# Patient Record
Sex: MALE | Race: WHITE | NOT HISPANIC OR LATINO | URBAN - METROPOLITAN AREA
[De-identification: names, ages, dates, MRNs, and addresses within clinical notes are randomized per-mention and may not be internally consistent; named-entity substitution may affect disease eponyms.]

---

## 2024-02-07 ENCOUNTER — TELEPHONE (OUTPATIENT)
Dept: PEDIATRICS | Facility: CLINIC | Age: 19
End: 2024-02-07

## 2024-02-07 NOTE — TELEPHONE ENCOUNTER
----- Message from Nathan Mohan MD sent at 1/22/2024 12:37 PM EST -----  Regarding: schedule Well Adult Visit  Let PATIENT know that they are due for Well Adult Care.    Based on our records, HE is quite  behind on their medical care and/or on immunizations.  If HE is followed by another provider, let us know and we will update our records.    Otherwise, please schedule a Well Visit as soon as possible.     If unable to reach by phone / portal, please send letter

## 2024-03-08 ENCOUNTER — TELEPHONE (OUTPATIENT)
Dept: PEDIATRICS | Facility: CLINIC | Age: 19
End: 2024-03-08

## 2024-03-08 NOTE — TELEPHONE ENCOUNTER
----- Message from Nathan Mohan MD sent at 3/7/2024 11:58 AM EST -----  Regarding: schedule Well Adult Visit  Let PATIENT know that they are due for Well Adult Care.    Please schedule such as soon as possible.     If unable to reach by phone / portal, please send letter

## 2024-05-14 ENCOUNTER — TELEPHONE (OUTPATIENT)
Dept: PEDIATRICS | Facility: CLINIC | Age: 19
End: 2024-05-14

## 2024-05-14 NOTE — TELEPHONE ENCOUNTER
----- Message from Nathan Mohan MD sent at 4/29/2024  2:16 PM EDT -----  Regarding: schedule Well Adult Visit  Let PATIENT know that they are due for Well Adult Care.    Please schedule such as soon as possible.     If unable to reach by phone / portal, please send letter

## 2024-05-14 NOTE — TELEPHONE ENCOUNTER
Called spoke to family member informed pt is due for Well Adult Care.  pt is at work they will have him call back.

## 2024-07-10 ENCOUNTER — TELEPHONE (OUTPATIENT)
Dept: PRIMARY CARE | Facility: CLINIC | Age: 19
End: 2024-07-10

## 2024-07-10 NOTE — TELEPHONE ENCOUNTER
----- Message from Nathan Mohan sent at 7/10/2024 10:03 AM EDT -----  Regarding: schedule Well Adult Visit  Let PATIENT know that they are due for Well Adult Care.    Please schedule such as soon as possible.     If unable to reach by phone / portal, please send letter

## 2024-10-01 ENCOUNTER — TELEPHONE (OUTPATIENT)
Dept: PEDIATRICS | Facility: CLINIC | Age: 19
End: 2024-10-01

## 2024-10-01 NOTE — TELEPHONE ENCOUNTER
----- Message from Nathan Mohan sent at 9/27/2024 11:26 AM EDT -----  Regarding: schedule Well Adult Visit  Let PATIENT know that they are due for Well Adult Care.    Based on our records, patient is quite  behind on their medical care and/or on immunizations.  If patient is followed by another provider, let us know and we will update our records.    Otherwise, please schedule a Well Visit as soon as possible.     If unable to reach by phone / portal, please send letter

## 2024-10-31 ENCOUNTER — TELEPHONE (OUTPATIENT)
Dept: PEDIATRICS | Facility: CLINIC | Age: 19
End: 2024-10-31

## 2024-11-01 PROBLEM — Z00.00 WELL ADULT HEALTH CHECK: Status: ACTIVE | Noted: 2024-11-01

## 2024-11-01 PROBLEM — I45.10 INCOMPLETE RIGHT BUNDLE BRANCH BLOCK: Status: ACTIVE | Noted: 2024-11-01

## 2024-11-01 PROBLEM — H54.7 VISUAL ACUITY REDUCED: Status: ACTIVE | Noted: 2024-11-01

## 2024-11-01 PROBLEM — E55.9 VITAMIN D DEFICIENCY: Status: ACTIVE | Noted: 2024-11-01

## 2024-11-01 PROBLEM — E78.5 DYSLIPIDEMIA: Status: ACTIVE | Noted: 2024-11-01

## 2024-11-04 ENCOUNTER — APPOINTMENT (OUTPATIENT)
Dept: PEDIATRICS | Facility: CLINIC | Age: 19
End: 2024-11-04
Payer: COMMERCIAL

## 2024-11-04 ENCOUNTER — TELEPHONE (OUTPATIENT)
Dept: PEDIATRICS | Facility: CLINIC | Age: 19
End: 2024-11-04

## 2024-11-04 VITALS
HEIGHT: 73 IN | SYSTOLIC BLOOD PRESSURE: 130 MMHG | RESPIRATION RATE: 20 BRPM | BODY MASS INDEX: 20.28 KG/M2 | DIASTOLIC BLOOD PRESSURE: 68 MMHG | TEMPERATURE: 98.7 F | WEIGHT: 153 LBS | HEART RATE: 80 BPM

## 2024-11-04 DIAGNOSIS — R94.31 SHORTENED PR INTERVAL: ICD-10-CM

## 2024-11-04 DIAGNOSIS — Z00.00 WELL ADULT HEALTH CHECK: Primary | ICD-10-CM

## 2024-11-04 DIAGNOSIS — Z13.0 ENCOUNTER FOR SCREENING FOR HEMATOLOGIC DISORDER: ICD-10-CM

## 2024-11-04 DIAGNOSIS — E55.9 VITAMIN D DEFICIENCY: ICD-10-CM

## 2024-11-04 DIAGNOSIS — Z11.9 ENCOUNTER FOR SCREENING EXAMINATION FOR INFECTIOUS DISEASE: ICD-10-CM

## 2024-11-04 DIAGNOSIS — B35.4 TINEA CORPORIS: ICD-10-CM

## 2024-11-04 DIAGNOSIS — J45.21 MILD INTERMITTENT REACTIVE AIRWAY DISEASE WITH ACUTE EXACERBATION (HHS-HCC): ICD-10-CM

## 2024-11-04 DIAGNOSIS — H54.7 VISUAL ACUITY REDUCED: ICD-10-CM

## 2024-11-04 DIAGNOSIS — Z13.220 ENCOUNTER FOR SCREENING FOR LIPID DISORDER: ICD-10-CM

## 2024-11-04 DIAGNOSIS — Z11.9 SCREENING EXAMINATION FOR INFECTIOUS DISEASE: ICD-10-CM

## 2024-11-04 DIAGNOSIS — Z13.31 DEPRESSION SCREEN: ICD-10-CM

## 2024-11-04 DIAGNOSIS — B35.1 ONYCHOMYCOSIS: ICD-10-CM

## 2024-11-04 DIAGNOSIS — Z11.3 SCREEN FOR STD (SEXUALLY TRANSMITTED DISEASE): ICD-10-CM

## 2024-11-04 DIAGNOSIS — Z00.129 ENCOUNTER FOR ROUTINE CHILD HEALTH EXAMINATION WITHOUT ABNORMAL FINDINGS: ICD-10-CM

## 2024-11-04 PROBLEM — J45.909 REACTIVE AIRWAY DISEASE (HHS-HCC): Status: ACTIVE | Noted: 2024-11-04

## 2024-11-04 PROBLEM — I45.10 INCOMPLETE RIGHT BUNDLE BRANCH BLOCK: Status: RESOLVED | Noted: 2024-11-01 | Resolved: 2024-11-04

## 2024-11-04 LAB
POC APPEARANCE, URINE: CLEAR
POC BILIRUBIN, URINE: NEGATIVE
POC BLOOD, URINE: NEGATIVE
POC COLOR, URINE: NORMAL
POC GLUCOSE, URINE: NEGATIVE MG/DL
POC HEMOGLOBIN: 13.6 G/DL (ref 13.5–17.5)
POC KETONES, URINE: NEGATIVE MG/DL
POC LEUKOCYTES, URINE: NEGATIVE
POC NITRITE,URINE: NEGATIVE
POC PH, URINE: 7.5 PH
POC PROTEIN, URINE: NEGATIVE MG/DL
POC SPECIFIC GRAVITY, URINE: 1.01
POC UROBILINOGEN, URINE: 0.2 EU/DL

## 2024-11-04 PROCEDURE — 90471 IMMUNIZATION ADMIN: CPT | Performed by: PEDIATRICS

## 2024-11-04 PROCEDURE — 94640 AIRWAY INHALATION TREATMENT: CPT | Performed by: PEDIATRICS

## 2024-11-04 PROCEDURE — 87491 CHLMYD TRACH DNA AMP PROBE: CPT

## 2024-11-04 PROCEDURE — 90656 IIV3 VACC NO PRSV 0.5 ML IM: CPT | Performed by: PEDIATRICS

## 2024-11-04 PROCEDURE — 96127 BRIEF EMOTIONAL/BEHAV ASSMT: CPT | Performed by: PEDIATRICS

## 2024-11-04 PROCEDURE — 99214 OFFICE O/P EST MOD 30 MIN: CPT | Performed by: PEDIATRICS

## 2024-11-04 PROCEDURE — 99385 PREV VISIT NEW AGE 18-39: CPT | Performed by: PEDIATRICS

## 2024-11-04 PROCEDURE — 85018 HEMOGLOBIN: CPT | Performed by: PEDIATRICS

## 2024-11-04 PROCEDURE — 3008F BODY MASS INDEX DOCD: CPT | Performed by: PEDIATRICS

## 2024-11-04 PROCEDURE — 81002 URINALYSIS NONAUTO W/O SCOPE: CPT | Performed by: PEDIATRICS

## 2024-11-04 PROCEDURE — 87591 N.GONORRHOEAE DNA AMP PROB: CPT

## 2024-11-04 PROCEDURE — 94664 DEMO&/EVAL PT USE INHALER: CPT | Performed by: PEDIATRICS

## 2024-11-04 PROCEDURE — 1036F TOBACCO NON-USER: CPT | Performed by: PEDIATRICS

## 2024-11-04 PROCEDURE — 93000 ELECTROCARDIOGRAM COMPLETE: CPT | Performed by: PEDIATRICS

## 2024-11-04 RX ORDER — GRISEOFULVIN 250 MG/1
500 TABLET ORAL DAILY
Qty: 30 TABLET | Refills: 0 | Status: SHIPPED | OUTPATIENT
Start: 2024-11-04 | End: 2024-12-04

## 2024-11-04 RX ORDER — ALBUTEROL SULFATE 0.83 MG/ML
2.5 SOLUTION RESPIRATORY (INHALATION) ONCE
Status: COMPLETED | OUTPATIENT
Start: 2024-11-04 | End: 2024-11-04

## 2024-11-04 RX ORDER — INHALER, ASSIST DEVICES
SPACER (EA) MISCELLANEOUS
Qty: 1 EACH | Refills: 0 | Status: SHIPPED | OUTPATIENT
Start: 2024-11-04

## 2024-11-04 RX ORDER — ALBUTEROL SULFATE 90 UG/1
2 INHALANT RESPIRATORY (INHALATION) EVERY 6 HOURS PRN
Qty: 18 G | Refills: 11 | Status: SHIPPED | OUTPATIENT
Start: 2024-11-04 | End: 2025-11-04

## 2024-11-04 NOTE — PROGRESS NOTES
Patient ID: Yuan Dolan is a 19 y.o. male who presents for Well Child (19 year [facility]).  Today he is un-accompanied.    Also concerned about several days of intermittent cough.  Denies nasal drainage, congestion, fevers, vomiting, diarrhea, otalgia.    Also expresses concerns regarding a red, raised, non-painful, itchy rash on patient's chest and yellow/brittle changes in his toenails.  There has been slow expansion of erythema, and scaling without edema.  There has not been discharge or fevers.  Denies nasal drainage, congestion, and cough.  Denies vomiting, diarrhea, otalgia.    Offer was made to have a nurse chaperone present during examination; patient declines offer.    The patient denies all TB risk factors (Specifically, patie nt denies that there has not been exposure to any of the following:  a homeless individual; a previously incarcerated individual; an immigrant from [place], [place], [place], [place], or [place]; an individual who is institutionalized; an individual who lives in a nursing home; an individual known to be infected with HIV; an individual known to be infected with TB.  The patientdenies having traveled to [place], [place], [place], [place], or [place]    Mental Health:  A screening questionnaire for depression was negative.      Tobacco:  Denies use  Alcohol:   Denies use  Drugs:     Denies use  Sexual activity:    Denies current or past sexual activity  Safety concerns:  Denies being the victim of harassment, bullying, gang involvement.  Denies current or past history of abuse (physical, sexual, verbal, or emotional)    Diet:  The patient was advised to consume 3 servings of green vegetables per day (if not, adherence with a MVI was stressed).  The patient was advised to consume a minimum of 2 servings of meat per week (if not, adherence with a MVI was stressed).  The patient was advised to assure 1000 mg of Calcium and 1000 IU's of Vitamin D per day.   Discussion of supplementing with Caltrate-D was advised is dairy product consumption is not sufficient.  All concerns and questions regarding diet, nutrition, and eating habits were addressed.     Elimination:  The patient denies concerns regarding chronic constipation or diarrhea.  Voiding:  The patient denies concerns regarding urination or urinary symptoms.  Sleep:  The patient denies concerns regarding sleep; specifically there are no issues regarding the patients ability to fall asleep, stay asleep, or sleep throughout the night.    HOME LIFE:  There are no concerns with regards to the patient's relationships at home.    School:   Graduated from high school.    Employment:   [employer]sembles  Sports:   none  Activities:   none    SOCIAL DETERMINANTS OF HEALTH:    Within the past 12 months, have you worried that your food would run out before you got money to buy more?  No  Within the past 12 months, the food you bought just did not last and you did not have money to get more?  No        Current Outpatient Medications:     albuterol 90 mcg/actuation inhaler, Inhale 2 puffs every 6 hours if needed for wheezing., Disp: 18 g, Rfl: 11    griseofulvin (Grifulvin V) 500 mg tablet, Take 1 tablet (500 mg) by mouth once daily., Disp: 30 tablet, Rfl: 0    inhalational spacing device (Aerochamber MV) inhaler, Use as instructed, Disp: 1 each, Rfl: 0  No current facility-administered medications for this visit.    Past Medical History:   Diagnosis Date    Other conditions influencing health status 2020    Birth of     Other conditions influencing health status 2020    No prior hospitalizations       Past Surgical History:   Procedure Laterality Date    OTHER SURGICAL HISTORY  2020    No history of surgery       No family history on file.    Social History     Tobacco Use    Smoking status: Never     Passive exposure: Current    Smokeless tobacco: Never   Vaping Use    Vaping status: Never Used  "      Objective   /68   Pulse 80   Temp 37.1 °C (98.7 °F) (Temporal)   Resp 20   Ht 1.86 m (6' 1.23\")   Wt 69.4 kg (153 lb)   BMI 20.06 kg/m²   BSA: 1.89 meters squared        BMI: Body mass index is 20.06 kg/m².   Growth percentiles: Height:  Facility age limit for growth %maggie is 20 years.   Weight:  Facility age limit for growth %maggie is 20 years.  BMI:  Facility age limit for growth %maggie is 20 years.    PHYSICAL EXAM  General  General Appearance - Not in acute distress, Not Irritable, Not Lethargic / Slow.  Mental Status - Alert.  Build & Nutrition - Well developed and Well nourished.  Hydration - Well hydrated.    Integumentary  blanching erythematous annular plaque 4 cm in diameter on chest.  Lesion has central clearing and mild scaling.  Yellowing of great toenails with mild dystrophic changes    Head and Neck  - - normalocephalic, neck supple, thyroid normal size and consistancy and no lymphadenopathy.  Head    Fontanelles and Sutures: Anterior [place] - Characteristics - closed. Posterior [place] - Characteristics - closed.  Neck  Global Assessment - full range of motion, non-tender, No lymphadenopathy, no nucchal rigidty, no torticollis.  Trachea - midline.    Eye  - - Bilateral - pupils equal and round (No strabismus), sclera clear and lids pink without edema or mass.  Fundi - Bilateral - Normal.    ENMT  - - Bilateral - TM pearly grey with good light reflex, external auditory canal pink and dry, nasopharynx moist and pink and oropharynx moist and pink, tonsils normal, uvula midline .  Ears  Pinna - Bilateral - no generalized tenderness observed. External Auditory Canal - Bilateral - no edema noted in EAC, no drainage observed.  Mouth and Throat  Oral Cavity/Oropharynx - Hard Palate - no asymmetry observed, no erythema noted. Soft Palate - no asymmetry noted, no erythema noted. Oral Mucosa - moist.    Chest and Lung Exam  - - Bilateral - Diffuse expiratory wheeze, normal breathing " effort and no chest deformity.  Inspection  Movements - Normal and Symmetrical. Accessory muscles - No use of accessory muscles in breathing.    Breast  - - Bilateral - symmetry, no mass palpable, no skin change and no nipple discharge.    Cardiovascular  - - regular rate and rhythm and no murmur, rub, or thrill.    Abdomen  - - soft, nontender, normal bowel sounds and no hepatomegaly, splenomegaly, or mass.  Inspection  Inspection of the abdomen reveals - No Abnormal pulsations, No Paradoxical movements and No Hernias. Skin - Inspection of the skin of the abdomen reveals - No Stria and No Ecchymoses.  Palpation/Percussion  Palpation and Percussion of the abdomen reveal - Soft, Non Tender, No Rebound tenderness, No Rigidity (guarding), No Abnormal dullness to percussion, No Abnormal tympany to percussion, No hepatosplenomegaly, No Palpable abdominal masses and No Subcutaneous crepitus.  Auscultation  Auscultation of the abdomen reveals - Bowel sounds normal, No Abdominal bruits and No Venous hums.    Male Genitourinary  - - Bilateral - normal circumcised phallus, testicle smooth, round, and normal size and no palpable inguinal hernia.  Evaluation of genitourinary system reveals - scrotum non-tender, no masses, normal testes, no palpable masses, urethral meatus normal, no discharge, normal penis and normal anus and perineum, no lesions.  Sexual Maturity  Girma 5 - Adult hair pattern, Adult penile size and shape and Adult testicular size and shape.    Peripheral Vascular  - - Bilateral - peripheral pulses palpable in upper and lower extremity and no edema present.  Upper Extremity  Inspection - Bilateral - No Cyanotic nailbeds, No Delayed capillary refill, no Digital clubbing, No Erythema, Not Pale, No Petechiae. Palpation - Temperature - Bilateral - Normal.  Lower Extremity  Inspection - Bilateral - No Cyanotic nailbeds, No Delayed capillary refill, No Erythema, Not Pale. Palpation - Temperature - Bilateral -  Normal.    Neurologic  - - normal sensation, cranial nerves II-XII intact and deep tendon reflexes normal.  Neurologic evaluation reveals  - normal sensation, normal coordination and upper and lower extremity deep tendon reflexes intact bilaterally .  Mental Status  Affect - normal. Speech - Normal. Thought content/perception - Normal. Cognitive function - Normal.  Cranial Nerves  III Oculomotor - Pupillary constriction - Bilateral - Normal. Eye Movements - Nystagmus - Bilateral - None.  Overall Assessment of Muscle Strength and Tone reveals  Upper Extremities - Right Deltoid - 5/5. Left Deltoid - 5/5. Right Bicep - 5/5. Left Bicep - 5/5. Right Tricep - 5/5. Left Tricep - 5/5. Right Intrinsics - 5/5. Left Intrinsics - 5/5. Lower Extremities - Right Iliopsoas - 5/5. Left Iliopsoas - 5/5. Right Quadriceps - 5/5. Left Quadriceps - 5/5. Right Hamstrings - 5/5. Left Hamstrings - 5/5. Right Tibialis Anterior - 5/5. Left Tibialis Anterior - 5/5. Right Gastroc-Soleus - 5/5. Left Gastroc-Soleus - 5/5.  Meningeal Signs - None.    Musculoskeletal  - - normal posture, normal gait and station, Head and neck are symmetric, no deformities, masses or tenderness, Head and neck show normal ROM without pain or weakness, Spine shows normal curvatures full ROM without pain or weakness, Upper extremities show normal ROM without pain or weakness, Lower extremities show full ROM without pain or weakness and Patient is able to heel walk, toe walk, and duck walk.  Lower Extremity  Hip - Examination of the right hip reveals - no instability, subluxation or laxity. Examination of the left hip reveals - no instability, subluxation or laxity.    Lymphatic  - - Bilateral - no lymphadenopathy.        Assessment/Plan   Problem List Items Addressed This Visit       Body mass index (BMI) of 20.0 to 20.9 in adult    Onychomycosis     Educated and reassured.  Discussed transmission of fungal skin infection.  Discussed duration of therapy required.   Discussed potential for bone marrow suppression, liver dysfunction, renal dysfunction with Griseofulvin and discussed laboratory monitoring if treatment beyond 1 month is required.  Follow-up in 1 month.           Relevant Medications    griseofulvin (Grifulvin V) 500 mg tablet    Reactive airway disease ([facility])     Reactive airway disease handout provided    Re-examined s/p neb #1 and still with biphasic wheeze.  Re-examined s/p neb #2 and now with expiratory wheeze.  Re-examined s/p neb #3 and clear to auscultation.    Use your bronchodialator (Albuterol, either a nebulizer dose or 2 puffs with spacer) for asthma symptoms, Return to Clinic or go to Emergency Department if requiring albuterol more frequently than every 4 hours, if signs of respiratory distress, if signs of dehydration, if new fevers or other new symptoms.           Relevant Medications    albuterol 2.5 mg /3 mL (0.083 %) nebulizer solution 2.5 mg (Completed)    albuterol 90 mcg/actuation inhaler    inhalational spacing device (Aerochamber MV) inhaler    Other Relevant Orders    Pulse oximetry, spot (Completed)    XR chest 2 views    Shortened MS interval    Relevant Orders    Referral to Cardiology    Tinea corporis    Relevant Medications    griseofulvin (Grifulvin V) 500 mg tablet    Visual acuity reduced     Vision deferred to optho / optometry         Vitamin D deficiency    Relevant Orders    Vitamin D 25-Hydroxy,Total (for eval of Vitamin D levels)    Well adult health check - Primary    Relevant Orders    POCT UA (nonautomated) manually resulted (Completed)    Hearing screen (Completed)     Other Visit Diagnoses       Encounter for screening for lipid disorder        Relevant Orders    Lipid Panel    Encounter for screening for hematologic disorder        Relevant Orders    CBC and Auto Differential    POCT hemoglobin manually resulted (Completed)    Depression screen        Relevant Orders    Staff Communication: ASQ, PHQ-9 (Completed)     Encounter for routine child health examination without abnormal findings        Relevant Orders    ECG 12 Lead (Completed)    Screen for STD (sexually transmitted disease)        Relevant Orders    HIV 1/2 Antigen/Antibody Screen with Reflex to Confirmation    C. trachomatis / N. gonorrhoeae, Amplified    Encounter for screening examination for infectious disease        Screening examination for infectious disease                Report:   Distortion product evoked otoacoustic emissions limited evaluation (to confirm the presence or absence of hearing disorder, at 2, 3, 4 and 5 kHz for each ear) were obtained.    interpretation:   Normal hearing      ANTICPIATORY GUIDANCE:  The following topics were discussed:   use of alcohol, tobacco, and drugs with discussion of health risks; acedemics with discussion of acedemic performance, extra-curricular activities, career planning; dental care and encouragment of biannual dental cleanings; fire safety; firearm safety; water safety; bullying and harassement; safe home and school environments; history of past or current abuse; helmet safety for all at risk recreational and competetive activities; sex including use of condoms, STD testing.  car safety and use of seatbelts.  Discussed importance of maintaining physical activity.      Nathan Mohan MD

## 2024-11-04 NOTE — ASSESSMENT & PLAN NOTE
Educated and reassured.  Discussed transmission of fungal skin infection.  Discussed duration of therapy required.  Discussed potential for bone marrow suppression, liver dysfunction, renal dysfunction with Griseofulvin and discussed laboratory monitoring if treatment beyond 1 month is required.  Follow-up in 1 month.

## 2024-11-04 NOTE — TELEPHONE ENCOUNTER
----- Message from Nathan Mohan sent at 11/4/2024  4:27 PM EST -----  Let PATIENT know that the depression screen reveals he may be suffering from depression.  If he is interested in evaluating this and/or treating this further, we encourage them to make another appointment to discuss.

## 2024-11-04 NOTE — ASSESSMENT & PLAN NOTE
Reactive airway disease handout provided    Re-examined s/p neb #1 and still with biphasic wheeze.  Re-examined s/p neb #2 and now with expiratory wheeze.  Re-examined s/p neb #3 and clear to auscultation.    Use your bronchodialator (Albuterol, either a nebulizer dose or 2 puffs with spacer) for asthma symptoms, Return to Clinic or go to Emergency Department if requiring albuterol more frequently than every 4 hours, if signs of respiratory distress, if signs of dehydration, if new fevers or other new symptoms.

## 2024-11-05 ENCOUNTER — TELEPHONE (OUTPATIENT)
Dept: PEDIATRICS | Facility: CLINIC | Age: 19
End: 2024-11-05
Payer: COMMERCIAL

## 2024-11-05 LAB
C TRACH RRNA SPEC QL NAA+PROBE: NEGATIVE
N GONORRHOEA DNA SPEC QL PROBE+SIG AMP: NEGATIVE

## 2024-11-05 NOTE — TELEPHONE ENCOUNTER
----- Message from Nathan Mohan sent at 11/5/2024 12:11 PM EST -----  Let PATIENT know  Gonnorhea was negative;  chlamydia was negative;

## 2024-12-04 ENCOUNTER — APPOINTMENT (OUTPATIENT)
Dept: PEDIATRICS | Facility: CLINIC | Age: 19
End: 2024-12-04
Payer: COMMERCIAL

## 2024-12-04 VITALS
TEMPERATURE: 99.4 F | DIASTOLIC BLOOD PRESSURE: 82 MMHG | BODY MASS INDEX: 20.77 KG/M2 | RESPIRATION RATE: 18 BRPM | SYSTOLIC BLOOD PRESSURE: 124 MMHG | HEART RATE: 68 BPM | WEIGHT: 158.4 LBS

## 2024-12-04 DIAGNOSIS — B35.1 ONYCHOMYCOSIS: ICD-10-CM

## 2024-12-04 DIAGNOSIS — B35.4 TINEA CORPORIS: ICD-10-CM

## 2024-12-04 DIAGNOSIS — J45.21 MILD INTERMITTENT REACTIVE AIRWAY DISEASE WITH ACUTE EXACERBATION (HHS-HCC): Primary | ICD-10-CM

## 2024-12-04 DIAGNOSIS — Z91.89 AT RISK FOR SIDE EFFECT OF MEDICATION: ICD-10-CM

## 2024-12-04 PROCEDURE — 99213 OFFICE O/P EST LOW 20 MIN: CPT | Performed by: PEDIATRICS

## 2024-12-04 PROCEDURE — 1036F TOBACCO NON-USER: CPT | Performed by: PEDIATRICS

## 2024-12-04 RX ORDER — INHALER, ASSIST DEVICES
SPACER (EA) MISCELLANEOUS
Qty: 1 EACH | Refills: 0 | Status: SHIPPED | OUTPATIENT
Start: 2024-12-04

## 2024-12-04 RX ORDER — GRISEOFULVIN 250 MG/1
500 TABLET ORAL DAILY
Qty: 30 TABLET | Refills: 0 | Status: SHIPPED | OUTPATIENT
Start: 2024-12-04 | End: 2025-01-03

## 2024-12-04 RX ORDER — ALBUTEROL SULFATE 90 UG/1
2 INHALANT RESPIRATORY (INHALATION) EVERY 6 HOURS PRN
Qty: 18 G | Refills: 11 | Status: SHIPPED | OUTPATIENT
Start: 2024-12-04 | End: 2025-12-04

## 2024-12-05 NOTE — PROGRESS NOTES
Patient ID: Yuan Dolan is a 19 y.o. male who presents for Follow-up (Follow up for rash on patients chest).  Today he is un-accompanied.     Here to Follow up on onychomycosis, tinea coportis, and reactive airway disease.   Since he was last seen, the area on his chest has improved slightly and his proximal toenail is coming in normally.      Denies nasal drainage, congestion, and cough.  Denies vomiting, diarrhea, otalgia.    Regarding his reactive airway disease , he never picked up his albuterol or spacer; he denies grunting, flaring, wheezing, retracting.      Current Outpatient Medications:     albuterol 90 mcg/actuation inhaler, Inhale 2 puffs every 6 hours if needed for wheezing., Disp: 18 g, Rfl: 11    griseofulvin (Grifulvin V) 500 mg tablet, Take 1 tablet (500 mg) by mouth once daily., Disp: 30 tablet, Rfl: 0    inhalational spacing device (Aerochamber MV) inhaler, Use as instructed, Disp: 1 each, Rfl: 0    Past Medical History:   Diagnosis Date    Other conditions influencing health status 2020    Birth of     Other conditions influencing health status 2020    No prior hospitalizations       Past Surgical History:   Procedure Laterality Date    OTHER SURGICAL HISTORY  2020    No history of surgery       No family history on file.    Social History     Tobacco Use    Smoking status: Never     Passive exposure: Current    Smokeless tobacco: Never   Vaping Use    Vaping status: Never Used       Objective   /82   Pulse 68   Temp 37.4 °C (99.4 °F) (Temporal)   Resp 18   Wt 71.8 kg (158 lb 6.4 oz)   BMI 20.77 kg/m²   BSA: 1.93 meters squared        BMI: Body mass index is 20.77 kg/m².   Growth percentiles: Height:  Facility age limit for growth %maggie is 20 years.   Weight:  Facility age limit for growth %maggie is 20 years.  BMI:  Facility age limit for growth %maggie is 20 years.    PHYSICAL EXAM  General  General Appearance - Not in acute distress, Not Irritable, Not  Lethargic / Slow.  Mental Status - Alert.  Build & Nutrition - Well developed and Well nourished.  Hydration - Well hydrated.    Integumentary  blanching erythematous annular plaque 3.5 cm in diameter on chest.  Lesion has central clearing and minimal scaling.  Yellowing of great toenails with mild dystrophic changes    Head and Neck  - - normalocephalic, neck supple, thyroid normal size and consistancy and no lymphadenopathy.  Neck  Global Assessment - full range of motion, non-tender, No lymphadenopathy, no nucchal rigidty, no torticollis.  Trachea - midline.    Eye  - - Bilateral - sclera clear.    ENMT  - - Bilateral - TM pearly grey with good light reflex, external auditory canal pink and dry, nasopharynx moist and pink and oropharynx moist and pink, tonsils normal, uvula midline .  Ears  Pinna - Bilateral - no generalized tenderness observed. External Auditory Canal - Bilateral - no edema noted in EAC, no drainage observed.    Chest and Lung Exam  - - Bilateral - air trapping in right upper lobe.  normal breathing effort and no chest deformity.  Inspection  Movements - Normal and Symmetrical. Accessory muscles - No use of accessory muscles in breathing.    Cardiovascular  - - regular rate and rhythm and no murmur, rub, or thrill.    Abdomen  - - soft, nontender, normal bowel sounds and no hepatomegaly, splenomegaly, or mass.  Inspection  Inspection of the abdomen reveals - No Abnormal pulsations, No Paradoxical movements and No Hernias. Skin - Inspection of the skin of the abdomen reveals - No Stria and No Ecchymoses.  Palpation/Percussion  Palpation and Percussion of the abdomen reveal - Soft, Non Tender, No Rebound tenderness, No Rigidity (guarding), No Abnormal dullness to percussion, No Abnormal tympany to percussion, No hepatosplenomegaly, No Palpable abdominal masses and No Subcutaneous crepitus.  Auscultation  Auscultation of the abdomen reveals - Bowel sounds normal, No Abdominal bruits and No Venous  hums.    Peripheral Vascular  - - Bilateral - peripheral pulses palpable in upper and lower extremity and no edema present.    Neurologic  Meningeal Signs - None.      Assessment/Plan   Problem List Items Addressed This Visit       Onychomycosis     Educated and reassured.  Discussed transmission of fungal skin infection.  Discussed duration of therapy required.  Discussed potential for bone marrow suppression, liver dysfunction, renal dysfunction with Griseofulvin and discussed laboratory monitoring if treatment beyond 1 month is required.  Follow-up in 1 month.           Relevant Medications    griseofulvin (Grifulvin V) 500 mg tablet    Other Relevant Orders    Comprehensive metabolic panel    Reactive airway disease ([facility]) - Primary     Your  REACTIVE AIRWAY DISEASE  currently stable.    Use your albuterol (2 puffs of the Albuterol HFA with Spacer or 1 Albuterol nebulizer vial) if having REACTIVE AIRWAY DISEASE / ASTHMA symptoms   Return to Clinic or go to Emergency Department if requiring Albuterol more frequently than every 4 hours, if signs of respiratory distress, if signs of dehydration, if new fevers or other new symptoms.           Relevant Medications    albuterol 90 mcg/actuation inhaler    inhalational spacing device (Aerochamber MV) inhaler    Tinea corporis    Relevant Medications    griseofulvin (Grifulvin V) 500 mg tablet     Other Visit Diagnoses       At risk for side effect of medication        Relevant Orders    CBC and Auto Differential            Nathan Mohan MD

## 2024-12-05 NOTE — ASSESSMENT & PLAN NOTE
Your  REACTIVE AIRWAY DISEASE  currently stable.    Use your albuterol (2 puffs of the Albuterol HFA with Spacer or 1 Albuterol nebulizer vial) if having REACTIVE AIRWAY DISEASE / ASTHMA symptoms   Return to Clinic or go to Emergency Department if requiring Albuterol more frequently than every 4 hours, if signs of respiratory distress, if signs of dehydration, if new fevers or other new symptoms.